# Patient Record
Sex: MALE | Race: WHITE | NOT HISPANIC OR LATINO | URBAN - METROPOLITAN AREA
[De-identification: names, ages, dates, MRNs, and addresses within clinical notes are randomized per-mention and may not be internally consistent; named-entity substitution may affect disease eponyms.]

---

## 2017-06-21 ENCOUNTER — FOLLOW UP ESTABLISHED (OUTPATIENT)
Dept: URBAN - METROPOLITAN AREA CLINIC 24 | Facility: CLINIC | Age: 77
End: 2017-06-21
Payer: MEDICARE

## 2017-06-21 DIAGNOSIS — H40.1131 PRIMARY OPEN-ANGLE GLAUCOMA, MILD STAGE, BILATERAL: Primary | ICD-10-CM

## 2017-06-21 PROCEDURE — 92083 EXTENDED VISUAL FIELD XM: CPT | Performed by: OPHTHALMOLOGY

## 2017-06-21 PROCEDURE — 92014 COMPRE OPH EXAM EST PT 1/>: CPT | Performed by: OPHTHALMOLOGY

## 2017-06-21 RX ORDER — BRIMONIDINE TARTRATE 1 MG/ML
0.1 % SOLUTION/ DROPS OPHTHALMIC
Qty: 3 | Refills: 3 | Status: INACTIVE
Start: 2017-06-21 | End: 2018-03-29

## 2017-06-21 RX ORDER — LATANOPROST 50 UG/ML
0.005 % SOLUTION OPHTHALMIC
Qty: 3 | Refills: 3 | Status: INACTIVE
Start: 2017-06-21 | End: 2018-09-26

## 2017-06-21 RX ORDER — TIMOLOL MALEATE 5 MG/ML
0.5 % SOLUTION OPHTHALMIC
Qty: 3 | Refills: 3 | Status: INACTIVE
Start: 2017-06-21 | End: 2018-09-26

## 2017-06-21 RX ORDER — BRINZOLAMIDE 10 MG/ML
1 % SUSPENSION/ DROPS OPHTHALMIC
Qty: 3 | Refills: 3 | Status: INACTIVE
Start: 2017-06-21 | End: 2018-09-26

## 2017-06-21 ASSESSMENT — INTRAOCULAR PRESSURE
OD: 18
OS: 21

## 2018-09-26 ENCOUNTER — FOLLOW UP ESTABLISHED (OUTPATIENT)
Dept: URBAN - METROPOLITAN AREA CLINIC 24 | Facility: CLINIC | Age: 78
End: 2018-09-26
Payer: MEDICARE

## 2018-09-26 PROCEDURE — 92250 FUNDUS PHOTOGRAPHY W/I&R: CPT | Performed by: OPHTHALMOLOGY

## 2018-09-26 PROCEDURE — 92083 EXTENDED VISUAL FIELD XM: CPT | Performed by: OPHTHALMOLOGY

## 2018-09-26 PROCEDURE — 92012 INTRM OPH EXAM EST PATIENT: CPT | Performed by: OPHTHALMOLOGY

## 2018-09-26 RX ORDER — TIMOLOL MALEATE 5 MG/ML
0.5 % SOLUTION OPHTHALMIC
Qty: 3 | Refills: 3 | Status: INACTIVE
Start: 2018-09-26 | End: 2019-12-06

## 2018-09-26 RX ORDER — BRIMONIDINE TARTRATE 2 MG/ML
0.2 % SOLUTION/ DROPS OPHTHALMIC
Qty: 3 | Refills: 3 | Status: INACTIVE
Start: 2018-09-26 | End: 2019-06-04

## 2018-09-26 RX ORDER — BRINZOLAMIDE 10 MG/ML
1 % SUSPENSION/ DROPS OPHTHALMIC
Qty: 3 | Refills: 3 | Status: INACTIVE
Start: 2018-09-26 | End: 2019-12-06

## 2018-09-26 RX ORDER — LATANOPROST 50 UG/ML
0.005 % SOLUTION OPHTHALMIC
Qty: 3 | Refills: 3 | Status: INACTIVE
Start: 2018-09-26 | End: 2019-12-06

## 2018-09-26 ASSESSMENT — INTRAOCULAR PRESSURE
OS: 19
OD: 20

## 2020-01-21 ENCOUNTER — FOLLOW UP ESTABLISHED (OUTPATIENT)
Dept: URBAN - METROPOLITAN AREA CLINIC 24 | Facility: CLINIC | Age: 80
End: 2020-01-21
Payer: MEDICARE

## 2020-01-21 PROCEDURE — 92250 FUNDUS PHOTOGRAPHY W/I&R: CPT | Performed by: OPHTHALMOLOGY

## 2020-01-21 PROCEDURE — 92012 INTRM OPH EXAM EST PATIENT: CPT | Performed by: OPHTHALMOLOGY

## 2020-01-21 PROCEDURE — 92133 CPTRZD OPH DX IMG PST SGM ON: CPT | Performed by: OPHTHALMOLOGY

## 2020-01-21 PROCEDURE — 92083 EXTENDED VISUAL FIELD XM: CPT | Performed by: OPHTHALMOLOGY

## 2020-01-21 RX ORDER — BRIMONIDINE TARTRATE 2 MG/ML
0.2 % SOLUTION/ DROPS OPHTHALMIC
Qty: 3 | Refills: 3 | Status: INACTIVE
Start: 2020-01-21 | End: 2021-01-26

## 2020-01-21 RX ORDER — TIMOLOL MALEATE 5 MG/ML
0.5 % SOLUTION OPHTHALMIC
Qty: 3 | Refills: 3 | Status: INACTIVE
Start: 2020-01-21 | End: 2021-01-26

## 2020-01-21 RX ORDER — LATANOPROST 50 UG/ML
0.005 % SOLUTION OPHTHALMIC
Qty: 3 | Refills: 3 | Status: INACTIVE
Start: 2020-01-21 | End: 2021-01-26

## 2020-01-21 RX ORDER — BRINZOLAMIDE 10 MG/ML
1 % SUSPENSION/ DROPS OPHTHALMIC
Qty: 3 | Refills: 3 | Status: INACTIVE
Start: 2020-01-21 | End: 2021-01-26

## 2020-01-21 ASSESSMENT — INTRAOCULAR PRESSURE
OD: 20
OS: 20

## 2021-01-26 ENCOUNTER — FOLLOW UP ESTABLISHED (OUTPATIENT)
Dept: URBAN - METROPOLITAN AREA CLINIC 24 | Facility: CLINIC | Age: 81
End: 2021-01-26
Payer: MEDICARE

## 2021-01-26 PROCEDURE — 92012 INTRM OPH EXAM EST PATIENT: CPT | Performed by: OPHTHALMOLOGY

## 2021-01-26 PROCEDURE — 92133 CPTRZD OPH DX IMG PST SGM ON: CPT | Performed by: OPHTHALMOLOGY

## 2021-01-26 PROCEDURE — 92083 EXTENDED VISUAL FIELD XM: CPT | Performed by: OPHTHALMOLOGY

## 2021-01-26 RX ORDER — BRIMONIDINE TARTRATE 2 MG/ML
0.2 % SOLUTION/ DROPS OPHTHALMIC
Qty: 3 | Refills: 3 | Status: ACTIVE
Start: 2021-01-26

## 2021-01-26 RX ORDER — BRINZOLAMIDE 10 MG/ML
1 % SUSPENSION/ DROPS OPHTHALMIC
Qty: 3 | Refills: 3 | Status: ACTIVE
Start: 2021-01-26

## 2021-01-26 RX ORDER — LATANOPROST 50 UG/ML
0.005 % SOLUTION OPHTHALMIC
Qty: 3 | Refills: 3 | Status: ACTIVE
Start: 2021-01-26

## 2021-01-26 RX ORDER — TIMOLOL MALEATE 5 MG/ML
0.5 % SOLUTION OPHTHALMIC
Qty: 3 | Refills: 3 | Status: ACTIVE
Start: 2021-01-26

## 2021-01-26 ASSESSMENT — INTRAOCULAR PRESSURE
OD: 19
OS: 19

## 2022-06-10 ENCOUNTER — OFFICE VISIT (OUTPATIENT)
Dept: URBAN - METROPOLITAN AREA CLINIC 24 | Facility: CLINIC | Age: 82
End: 2022-06-10
Payer: MEDICARE

## 2022-06-10 DIAGNOSIS — H40.1131 PRIMARY OPEN-ANGLE GLAUCOMA, BILATERAL, MILD STAGE: Primary | ICD-10-CM

## 2022-06-10 PROCEDURE — 99213 OFFICE O/P EST LOW 20 MIN: CPT | Performed by: OPHTHALMOLOGY

## 2022-06-10 PROCEDURE — 92133 CPTRZD OPH DX IMG PST SGM ON: CPT | Performed by: OPHTHALMOLOGY

## 2022-06-10 ASSESSMENT — INTRAOCULAR PRESSURE
OS: 23
OD: 23

## 2022-06-10 NOTE — IMPRESSION/PLAN
Impression: Primary open-angle glaucoma, mild stage, bilateral Plan: PT HAS POAG OU           GONIO: SS OU        PACHS [phone] THE PT IS UNDER CARE OF DR Shirley Cruz O.D. S/P LPI OD 2/11/09 // S/P LPI  OS 2/25/09 S/P SLT OD 4/21/10 // S/P SLT OS 4/30/10 PT DENIES SULFA ALLERGY
PT DENIES LUNG DZ
TARGET MID TEENS OU 
RECOMMEND 1. CONTINUE LATANAPROST  OU QPM 
2. CONTNUE TIMOLOL 1/2 OU QAM 
3. CONTINUE AZOPT OU TID 4. CONTINUE BRIMONIDINE OU TID 5. RECOMMEND AJUNCTIVE SLT OU, THE PT DEFERS 12/26/12- 06/10/22 6. THE PT IS AWARE OF THE RISKS ASSOCIATED WITH ELEVATED IOP 7.  THE PT WISHES TO RETURN IN 12 MONTHS, I RECOMMEND HE RETURN SOONER IF HE WISHES TO UNDERGO SLT

## 2023-06-20 ENCOUNTER — OFFICE VISIT (OUTPATIENT)
Dept: URBAN - METROPOLITAN AREA CLINIC 24 | Facility: CLINIC | Age: 83
End: 2023-06-20
Payer: MEDICARE

## 2023-06-20 DIAGNOSIS — H40.1131 PRIMARY OPEN-ANGLE GLAUCOMA, BILATERAL, MILD STAGE: Primary | ICD-10-CM

## 2023-06-20 PROCEDURE — 99214 OFFICE O/P EST MOD 30 MIN: CPT | Performed by: OPHTHALMOLOGY

## 2023-06-20 PROCEDURE — 92083 EXTENDED VISUAL FIELD XM: CPT | Performed by: OPHTHALMOLOGY

## 2023-06-20 RX ORDER — TIMOLOL MALEATE 5 MG/ML
0.5 % SOLUTION/ DROPS OPHTHALMIC
Qty: 15 | Refills: 3 | Status: ACTIVE
Start: 2023-06-20

## 2023-06-20 RX ORDER — BRIMONIDINE TARTRATE 2 MG/ML
0.2 % SOLUTION/ DROPS OPHTHALMIC
Qty: 3 | Refills: 3 | Status: ACTIVE
Start: 2023-06-20

## 2023-06-20 RX ORDER — LATANOPROST 50 UG/ML
0.005 % SOLUTION OPHTHALMIC
Qty: 3 | Refills: 3 | Status: ACTIVE
Start: 2023-06-20

## 2023-06-20 RX ORDER — BRINZOLAMIDE 10 MG/ML
1 % SUSPENSION/ DROPS OPHTHALMIC
Qty: 3 | Refills: 3 | Status: ACTIVE
Start: 2023-06-20

## 2023-06-20 ASSESSMENT — INTRAOCULAR PRESSURE
OS: 23
OD: 23

## 2023-06-20 NOTE — IMPRESSION/PLAN
Impression: Primary open-angle glaucoma, mild stage, bilateral Plan: PT HAS POAG OU           GONIO: SS OU        PACHS [phone] THE PT IS UNDER CARE OF DR Lizbet Cervantes O.D. S/P LPI OD 2/11/09 // S/P LPI  OS 2/25/09 S/P SLT OD 4/21/10 // S/P SLT OS 4/30/10 PT DENIES SULFA ALLERGY
PT DENIES LUNG DZ
TARGET MID TEENS OU 
RECOMMEND 1. CONTINUE LATANAPROST  OU QPM 
2. CONTNUE TIMOLOL 1/2 OU QAM 
3. CONTINUE AZOPT OU TID 4. CONTINUE BRIMONIDINE OU TID 5. RECOMMEND AJUNCTIVE SLT OU, THE PT DEFERS 12/26/12- 06/20/23 6. THE PT IS AWARE OF THE RISKS ASSOCIATED WITH ELEVATED IOP 7. REFER TO DR Will Clark FOR CATARACTS OU AND GLAUCOMA MGMT

## 2023-07-12 ENCOUNTER — OFFICE VISIT (OUTPATIENT)
Dept: URBAN - METROPOLITAN AREA CLINIC 24 | Facility: CLINIC | Age: 83
End: 2023-07-12
Payer: MEDICARE

## 2023-07-12 DIAGNOSIS — H25.811 COMBINED FORMS OF AGE-RELATED CATARACT, RIGHT EYE: ICD-10-CM

## 2023-07-12 DIAGNOSIS — H25.812 COMBINED FORMS OF AGE-RELATED CATARACT, LEFT EYE: ICD-10-CM

## 2023-07-12 DIAGNOSIS — H40.1131 PRIMARY OPEN-ANGLE GLAUCOMA, BILATERAL, MILD STAGE: Primary | ICD-10-CM

## 2023-07-12 DIAGNOSIS — H35.3131 NONEXUDATIVE MACULAR DEGENERATION, EARLY DRY STAGE, BILATERAL: ICD-10-CM

## 2023-07-12 PROCEDURE — 99214 OFFICE O/P EST MOD 30 MIN: CPT | Performed by: OPHTHALMOLOGY

## 2023-07-12 ASSESSMENT — INTRAOCULAR PRESSURE
OD: 17
OS: 19

## 2023-07-12 ASSESSMENT — VISUAL ACUITY
OD: 20/40
OS: 20/25

## 2023-07-12 ASSESSMENT — KERATOMETRY
OS: 42.62
OD: 42.70

## 2023-07-12 NOTE — IMPRESSION/PLAN
Impression: Combined forms of age-related cataract, right eye: H25.811. Plan: (Standard OD // Standard OS )( AIM -0.25 OU:  Trimoxi 1st choice, Topical, No ORA, NO LenSx, + MIGS :(OMNI,HYDRUS)) - Min 10 Possible ring OU - poor dilation on preop Needs ASCAN prior to surgery Pt is aware that AMD limits BCVA OD>OS. No multifocal 2/2 AMD 

Discussed cataract diagnosis with the patient. Appropriate testing ordered for cataract diagnosis prior to Preop. Risks and benefits of surgical treatment were discussed and understood. Patient desires surgical treatment. Discussed lens options with pt and pt is ok with wearing glasses after surgery. Patient desires to proceed with surgery. Both eyes examined, medically necessary due to impact in activities of daily living. Discussed with pt limitations of MIGS device and it does not replace  Glaucoma eye drops and would have to continue treatment and would still need glasses after surgery. Discussed higher risks with smaller pupil and discussed iris stretch and higher risks of bleeding.  Discussed there is a chance of developing capsular haze after surgery, which may be corrected with laser/yag

## 2023-07-12 NOTE — IMPRESSION/PLAN
Impression: Nonexudative macular degeneration, early dry stage, bilateral: H35.3131. Plan: Macular degeneration, dry type with stable vision. Will continue to monitor vision and the patient has been instructed to call with any vision changes. Recommend to start AREDS vitamins.

## 2023-07-12 NOTE — IMPRESSION/PLAN
Impression: Primary open-angle glaucoma, mild stage, bilateral
s/ p SLT OU 2009/2010 Plan: Pt has Glaucoma    Gonio : SS OU       Pachs:[phone]      Today's IOP :  17/19       Tmax  : 24/24 Target IOP Mid teens Fhx of Glaucoma Left eye is the better seeing eye HVF Full OU 6/20/23 C/D: 0.3//0.4 OCT: 81/75 6/10/22 Pt denies Sulfa Allergy   // Pt denies Lung /Heart dx Plan :
1. Continue Latanoprost QHS OU Timolol QAM OU Azopt TID OU Brimonidine TID OU Discussed details about Glaucoma and that without proper control of pressures irreversible blindness can occur. Patient understands risks. Emphasize compliance with drop and without compliance vision loss progression can occur. 
2. Plan for [address] with [id number]

## 2023-08-28 ENCOUNTER — ADULT PHYSICAL (OUTPATIENT)
Dept: URBAN - METROPOLITAN AREA CLINIC 24 | Facility: CLINIC | Age: 83
End: 2023-08-28
Payer: MEDICARE

## 2023-08-28 DIAGNOSIS — Z01.818 ENCOUNTER FOR OTHER PREPROCEDURAL EXAMINATION: Primary | ICD-10-CM

## 2023-08-28 DIAGNOSIS — H40.1131 PRIMARY OPEN-ANGLE GLAUCOMA, BILATERAL, MILD STAGE: ICD-10-CM

## 2023-08-28 DIAGNOSIS — H25.811 COMBINED FORMS OF AGE-RELATED CATARACT, RIGHT EYE: Primary | ICD-10-CM

## 2023-08-28 PROCEDURE — 99203 OFFICE O/P NEW LOW 30 MIN: CPT | Performed by: REGISTERED NURSE

## 2023-08-28 ASSESSMENT — PACHYMETRY
OD: 23.73
OS: 3.26
OS: 23.80
OD: 3.34

## 2023-09-07 ENCOUNTER — SURGERY (OUTPATIENT)
Dept: URBAN - METROPOLITAN AREA SURGERY 12 | Facility: SURGERY | Age: 83
End: 2023-09-07
Payer: MEDICARE

## 2023-09-07 DIAGNOSIS — H40.1131 PRIMARY OPEN-ANGLE GLAUCOMA, BILATERAL, MILD STAGE: ICD-10-CM

## 2023-09-07 DIAGNOSIS — H25.811 COMBINED FORMS OF AGE-RELATED CATARACT, RIGHT EYE: Primary | ICD-10-CM

## 2023-09-07 PROCEDURE — 66991 XCAPSL CTRC RMVL INSJ 1+: CPT | Performed by: OPHTHALMOLOGY

## 2023-09-08 ENCOUNTER — POST-OPERATIVE VISIT (OUTPATIENT)
Dept: URBAN - METROPOLITAN AREA CLINIC 24 | Facility: CLINIC | Age: 83
End: 2023-09-08
Payer: MEDICARE

## 2023-09-08 DIAGNOSIS — Z48.810 ENCOUNTER FOR SURGICAL AFTERCARE FOLLOWING SURGERY ON A SENSE ORGAN: Primary | ICD-10-CM

## 2023-09-08 PROCEDURE — 99024 POSTOP FOLLOW-UP VISIT: CPT | Performed by: STUDENT IN AN ORGANIZED HEALTH CARE EDUCATION/TRAINING PROGRAM

## 2023-09-08 ASSESSMENT — INTRAOCULAR PRESSURE: OD: 8

## 2023-09-15 ENCOUNTER — POST-OPERATIVE VISIT (OUTPATIENT)
Dept: URBAN - METROPOLITAN AREA CLINIC 24 | Facility: CLINIC | Age: 83
End: 2023-09-15
Payer: MEDICARE

## 2023-09-15 DIAGNOSIS — Z48.810 ENCOUNTER FOR SURGICAL AFTERCARE FOLLOWING SURGERY ON A SENSE ORGAN: Primary | ICD-10-CM

## 2023-09-15 PROCEDURE — 99024 POSTOP FOLLOW-UP VISIT: CPT | Performed by: STUDENT IN AN ORGANIZED HEALTH CARE EDUCATION/TRAINING PROGRAM

## 2023-09-15 RX ORDER — PREDNISOLONE ACETATE 10 MG/ML
1 % SUSPENSION/ DROPS OPHTHALMIC
Qty: 5 | Refills: 1 | Status: ACTIVE
Start: 2023-09-15

## 2023-09-15 ASSESSMENT — INTRAOCULAR PRESSURE
OD: 12
OS: 21
OD: 21

## 2023-09-15 ASSESSMENT — VISUAL ACUITY
OD: 20/20
OS: 20/20

## 2023-09-21 ENCOUNTER — SURGERY (OUTPATIENT)
Dept: URBAN - METROPOLITAN AREA SURGERY 12 | Facility: SURGERY | Age: 83
End: 2023-09-21
Payer: MEDICARE

## 2023-09-21 DIAGNOSIS — H25.812 COMBINED FORMS OF AGE-RELATED CATARACT, LEFT EYE: Primary | ICD-10-CM

## 2023-09-21 DIAGNOSIS — H40.1131 PRIMARY OPEN-ANGLE GLAUCOMA, BILATERAL, MILD STAGE: ICD-10-CM

## 2023-09-21 PROCEDURE — 66991 XCAPSL CTRC RMVL INSJ 1+: CPT | Performed by: OPHTHALMOLOGY

## 2023-09-22 ENCOUNTER — POST-OPERATIVE VISIT (OUTPATIENT)
Dept: URBAN - METROPOLITAN AREA CLINIC 24 | Facility: CLINIC | Age: 83
End: 2023-09-22
Payer: MEDICARE

## 2023-09-22 DIAGNOSIS — Z48.810 ENCOUNTER FOR SURGICAL AFTERCARE FOLLOWING SURGERY ON A SENSE ORGAN: Primary | ICD-10-CM

## 2023-09-22 PROCEDURE — 99024 POSTOP FOLLOW-UP VISIT: CPT | Performed by: STUDENT IN AN ORGANIZED HEALTH CARE EDUCATION/TRAINING PROGRAM

## 2023-09-22 ASSESSMENT — INTRAOCULAR PRESSURE: OS: 11

## 2023-10-19 ENCOUNTER — POST-OPERATIVE VISIT (OUTPATIENT)
Dept: URBAN - METROPOLITAN AREA CLINIC 24 | Facility: CLINIC | Age: 83
End: 2023-10-19
Payer: MEDICARE

## 2023-10-19 DIAGNOSIS — Z48.810 ENCOUNTER FOR SURGICAL AFTERCARE FOLLOWING SURGERY ON A SENSE ORGAN: Primary | ICD-10-CM

## 2023-10-19 PROCEDURE — 99024 POSTOP FOLLOW-UP VISIT: CPT | Performed by: STUDENT IN AN ORGANIZED HEALTH CARE EDUCATION/TRAINING PROGRAM

## 2023-10-19 ASSESSMENT — VISUAL ACUITY
OD: 20/25
OS: 20/20

## 2023-10-19 ASSESSMENT — INTRAOCULAR PRESSURE
OD: 20
OS: 14

## 2024-04-09 ENCOUNTER — OFFICE VISIT (OUTPATIENT)
Dept: URBAN - METROPOLITAN AREA CLINIC 24 | Facility: CLINIC | Age: 84
End: 2024-04-09
Payer: MEDICARE

## 2024-04-09 DIAGNOSIS — H40.1131 PRIMARY OPEN-ANGLE GLAUCOMA, BILATERAL, MILD STAGE: Primary | ICD-10-CM

## 2024-04-09 PROCEDURE — 92133 CPTRZD OPH DX IMG PST SGM ON: CPT | Performed by: OPHTHALMOLOGY

## 2024-04-09 PROCEDURE — 99213 OFFICE O/P EST LOW 20 MIN: CPT | Performed by: OPHTHALMOLOGY

## 2024-04-09 ASSESSMENT — INTRAOCULAR PRESSURE
OS: 16
OD: 16

## 2024-10-08 ENCOUNTER — OFFICE VISIT (OUTPATIENT)
Dept: URBAN - METROPOLITAN AREA CLINIC 30 | Facility: CLINIC | Age: 84
End: 2024-10-08
Payer: MEDICARE

## 2024-10-08 DIAGNOSIS — Z96.1 PRESENCE OF INTRAOCULAR LENS: ICD-10-CM

## 2024-10-08 DIAGNOSIS — H40.1131 PRIMARY OPEN-ANGLE GLAUCOMA, BILATERAL, MILD STAGE: ICD-10-CM

## 2024-10-08 DIAGNOSIS — H35.3131 NONEXUDATIVE MACULAR DEGENERATION, EARLY DRY STAGE, BILATERAL: ICD-10-CM

## 2024-10-08 DIAGNOSIS — H26.493 OTHER SECONDARY CATARACT, BILATERAL: Primary | ICD-10-CM

## 2024-10-08 PROCEDURE — 92014 COMPRE OPH EXAM EST PT 1/>: CPT

## 2024-10-08 PROCEDURE — 92134 CPTRZ OPH DX IMG PST SGM RTA: CPT

## 2024-10-08 ASSESSMENT — VISUAL ACUITY
OS: 20/25
OD: 20/20

## 2024-10-08 ASSESSMENT — INTRAOCULAR PRESSURE
OS: 11
OD: 10

## 2025-02-11 ENCOUNTER — OFFICE VISIT (OUTPATIENT)
Dept: URBAN - METROPOLITAN AREA CLINIC 24 | Facility: CLINIC | Age: 85
End: 2025-02-11
Payer: MEDICARE

## 2025-02-11 DIAGNOSIS — H40.1131 PRIMARY OPEN-ANGLE GLAUCOMA, BILATERAL, MILD STAGE: Primary | ICD-10-CM

## 2025-02-11 PROCEDURE — 99213 OFFICE O/P EST LOW 20 MIN: CPT | Performed by: OPHTHALMOLOGY

## 2025-02-11 PROCEDURE — 92083 EXTENDED VISUAL FIELD XM: CPT | Performed by: OPHTHALMOLOGY

## 2025-02-11 ASSESSMENT — INTRAOCULAR PRESSURE
OD: 10
OS: 11